# Patient Record
(demographics unavailable — no encounter records)

---

## 2024-10-07 NOTE — REASON FOR VISIT
[Home] : at home, [unfilled] , at the time of the visit. [Medical Office: ([facility])___] : at the medical office located in  [Initial Consultation] : an initial consultation [Phimosis] : phimosis [PCP] : ~pcp~ [Parents] : parents

## 2024-10-07 NOTE — REASON FOR VISIT
[Home] : at home, [unfilled] , at the time of the visit. [Medical Office: ([facility])___] : at the medical office located in  [Initial Consultation] : an initial consultation [Phimosis] : phimosis [PCP] : ~pcp~ [Parents] : parents

## 2024-10-08 NOTE — HISTORY OF PRESENT ILLNESS
[TextBox_4] : I verified the identity of the patient and the reason for the appointment with the parent. I explained to the parent that telemedicine encounters are not the same as a direct patient/healthcare provider visit because the patient and healthcare provider are not in the same room, which can result in limitations, including with the physical examination. I explained that the telemedicine encounter may require the patients genitalia to be shown. I explained that after the telemedicine encounter, the patient may require an office visit for an in-person physical examination, ultrasound, or other testing. I informed the parent that there may be privacy risks associated with the use of the technology and that there may be costs associated with the encounter. I offered the option of an office visit rather than a telemedicine encounter. Parent stated that all explanations were understood, and that all questions were answered to their satisfaction. The parent verbalized their preference and consent to proceed with the telemedicine encounter.  TERRI is here today for evaluation.  He was born at term after an unassisted conception and uneventful pregnancy and delivery.  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 Making ample wet diapers.  No infections.

## 2024-10-08 NOTE — CONSULT LETTER
[FreeTextEntry1] : Dear Dr. MEAGAN BANUELOS ,  I had the pleasure of consulting on ERICAWENDIS MASTORA today.  Below is my note regarding the office visit today.  Thank you so very much for allowing me to participate in TERRI's  care.  Please don't hesitate to call me should any questions or issues arise .  Sincerely,   Winston Bass MD, FACS, FSPU Chief, Pediatric Urology Professor of Urology and Pediatrics [employer] [facility] [employer]  President, [employer] Association - [place] Section Past-President, Societies for Pediatric Urology

## 2024-10-08 NOTE — ASSESSMENT
[FreeTextEntry1] : TERRI has phimosis.  We discussed phimosis and its implications,  We then discussed the management options, including monitoring, use of steroids, and circumcision. The risks and benefits and possible complications of each option (including but not limited to reaction to steroids, bleeding, injury, incomplete circumcision and need for additional injury) was discussed and all questions were answered.  The decision for in office circumcision with EMLA was made, which they will schedule.  I reiterated the anticipated post-circumcision course and instructions.  All questions were answered

## 2024-10-08 NOTE — CONSULT LETTER
[FreeTextEntry1] : Dear Dr. MEAGAN BANUELOS ,  I had the pleasure of consulting on ERICAWENDIS MASTORA today.  Below is my note regarding the office visit today.  Thank you so very much for allowing me to participate in TERRI's  care.  Please don't hesitate to call me should any questions or issues arise .  Sincerely,   Winston Bass MD, FACS, FSPU Chief, Pediatric Urology Professor of Urology and Pediatrics [facility] [employer]  President, [employer] Association - [place] Section Past-President, Societies for Pediatric Urology

## 2024-10-09 NOTE — HISTORY OF PRESENT ILLNESS
[TextBox_4] : TERRI is here today for a follow up visit.  He was born at term after an unassisted conception and uneventful pregnancy and delivery.  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 Making ample wet diapers.  No infections.

## 2024-10-09 NOTE — CONSULT LETTER
[FreeTextEntry1] : Dear Dr. MEAGAN BANUELOS ,  I had the pleasure of seeing  TERRI CARVAJAL for follow up today.  Below is my note regarding the office visit today.  Thank you so very much for allowing me to participate in TERRI's  care.  Please don't hesitate to call me should any questions or issues arise .  Sincerely,   Winston Bass MD, FACS, FSPU Chief, Pediatric Urology Professor of Urology and Pediatrics [facility] [employer]  President, [employer] Association - [place] Section Past-President, Societies for Pediatric Urology

## 2024-10-09 NOTE — CONSULT LETTER
[FreeTextEntry1] : Dear Dr. MEAGAN BANUELOS ,  I had the pleasure of seeing  TERRI CARVAJAL for follow up today.  Below is my note regarding the office visit today.  Thank you so very much for allowing me to participate in TERRI's  care.  Please don't hesitate to call me should any questions or issues arise .  Sincerely,   Winston Bass MD, FACS, FSPU Chief, Pediatric Urology Professor of Urology and Pediatrics [facility] [employer]  President, American U[employer] Association - [place] Section Past-President, Societies for Pediatric Urology

## 2024-10-09 NOTE — ASSESSMENT
[FreeTextEntry1] : TERRI has phimosis; no abnormalities were noted today.  We discussed phimosis and its implications,  We then discussed the management options, including monitoring, use of steroids, and circumcision. The risks and benefits and possible complications of each option (including but not limited to reaction to steroids, bleeding, injury, incomplete circumcision and need for additional injury) was discussed and all questions were answered.  The decision for in office circumcision with EMLA was made.  We performed the procedure using a Plastibell that needs to fall off spontaneously or in the office if needed.  I reiterated the anticipated post-circumcision course and instructions.  All questions were answered.

## 2024-10-24 NOTE — HISTORY OF PRESENT ILLNESS
[Breast milk] : breast milk [Formula ___ oz/feed] : [unfilled] oz of formula per feed [Hours between feeds ___] : Child is fed every [unfilled] hours [Frequency of stools: ___] : Frequency of stools: [unfilled]  stools [every other day] : every other day. [Green/brown] : green/brown [Loose] : loose consistency [In Bassinet/Crib] : sleeps in bassinet/crib [On back] : sleeps on back [Pacifier use] : Pacifier use [No] : No cigarette smoke exposure [Rear facing car seat in back seat] : Rear facing car seat in back seat [Carbon Monoxide Detectors] : Carbon monoxide detectors at home [Smoke Detectors] : Smoke detectors at home. [Mother] : mother [Normal] : Normal [Water heater temperature set at <120 degrees F] : Water heater temperature set at <120 degrees F [NO] : No [Co-sleeping] : no co-sleeping [Loose bedding, pillow, toys, and/or bumpers in crib] : no loose bedding, pillow, toys, and/or bumpers in crib [Exposure to electronic nicotine delivery system] : No exposure to electronic nicotine delivery system [At risk for exposure to TB] : Not at risk for exposure to Tuberculosis  [FreeTextEntry8] : a little constipation

## 2024-11-15 NOTE — HISTORY OF PRESENT ILLNESS
[EENT/Resp Symptoms] : EENT/RESPIRATORY SYMPTOMS [Nasal congestion] : nasal congestion [Cough] : cough [___ Day(s)] : [unfilled] day(s) [Wet cough] : wet cough [Known exposure to COVID-19] : no known exposure to COVID-19 [Hx of recent COVID-19 infection] : no history of recent COVID-19 infection

## 2024-11-26 NOTE — HISTORY OF PRESENT ILLNESS
[Exposure to electronic nicotine delivery system] : No exposure to electronic nicotine delivery system [At risk for exposure to TB] : Not at risk for exposure to Tuberculosis

## 2024-11-26 NOTE — DISCUSSION/SUMMARY
[Normal Growth] : growth [Normal Development] : development  [None] : no medical problems [] : The components of the vaccine(s) to be administered today are listed in the plan of care. The disease(s) for which the vaccine(s) are intended to prevent and the risks have been discussed with the caretaker.  The risks are also included in the appropriate vaccination information statements which have been provided to the patient's caregiver.  The caregiver has given consent to vaccinate. [FreeTextEntry1] : Recommend exclusive breastfeeding, 8-12 feedings per day. Mother should continue prenatal vitamins and avoid alcohol. If formula is needed, recommend iron-fortified formulations, 2-4 oz every 3-4 hrs. When in car, patient should be in rear-facing car seat in back seat. Put baby to sleep on back, in own crib with no loose or soft bedding. Help baby to maintain sleep and feeding routines. May offer pacifier if needed. Continue tummy time when awake. Parents counseled to call if rectal temperature >100.4 degrees F. Dtap/IPV/Hib, PCV, rota vaccines today. Tolerated well.

## 2024-11-26 NOTE — PHYSICAL EXAM
[Alert] : alert [Acute Distress] : no acute distress [Normocephalic] : normocephalic [Flat Open Anterior [place]] : flat open anterior fontanelle [PERRL] : PERRL [Red Reflex Bilateral] : red reflex bilateral [Normally Placed Ears] : normally placed ears [Auricles Well Formed] : auricles well formed [Clear Tympanic membranes] : clear tympanic membranes [Light reflex present] : light reflex present [Bony landmarks visible] : bony landmarks visible [Discharge] : no discharge [Nares Patent] : nares patent [Palate Intact] : palate intact [Uvula Midline] : uvula midline [Supple, full passive range of motion] : supple, full passive range of motion [Palpable Masses] : no palpable masses [Symmetric Chest Rise] : symmetric chest rise [Clear to Auscultation Bilaterally] : clear to auscultation bilaterally [Regular Rate and Rhythm] : regular rate and rhythm [S1, S2 present] : S1, S2 present [Murmurs] : no murmurs [+2 Femoral Pulses] : +2 femoral pulses [Soft] : soft [Tender] : nontender [Distended] : not distended [Bowel Sounds] : bowel sounds present [Hepatomegaly] : no hepatomegaly [Splenomegaly] : no splenomegaly [Normal external genitailia] : normal external genitalia [Central Urethral Opening] : central urethral opening [Testicles Descended Bilaterally] : testicles descended bilaterally [Normally Placed] : normally placed [No Abnormal Lymph Nodes Palpated] : no abnormal lymph nodes palpated [Woodward-Ortolani] : negative Woodward-Ortolani [Symmetric Flexed Extremities] : symmetric flexed extremities [Spinal Dimple] : no spinal dimple [Tuft of Hair] : no tuft of hair [Startle Reflex] : startle reflex present [Suck Reflex] : suck reflex present [Rooting] : rooting reflex present [Palmar Grasp] : palmar grasp reflex present [Plantar Grasp] : plantar grasp reflex present [Symmetric Ash] : symmetric [place] [Rash and/or lesion present] : no rash/lesion

## 2024-11-26 NOTE — PHYSICAL EXAM
[Alert] : alert [Acute Distress] : no acute distress [Normocephalic] : normocephalic [Flat Open Anterior [place]] : flat open anterior fontanelle [PERRL] : PERRL [Red Reflex Bilateral] : red reflex bilateral [Normally Placed Ears] : normally placed ears [Auricles Well Formed] : auricles well formed [Clear Tympanic membranes] : clear tympanic membranes [Light reflex present] : light reflex present [Bony landmarks visible] : bony landmarks visible [Discharge] : no discharge [Nares Patent] : nares patent [Palate Intact] : palate intact [Uvula Midline] : uvula midline [Supple, full passive range of motion] : supple, full passive range of motion [Palpable Masses] : no palpable masses [Symmetric Chest Rise] : symmetric chest rise [Clear to Auscultation Bilaterally] : clear to auscultation bilaterally [Regular Rate and Rhythm] : regular rate and rhythm [S1, S2 present] : S1, S2 present [Murmurs] : no murmurs [+2 Femoral Pulses] : +2 femoral pulses [Soft] : soft [Tender] : nontender [Distended] : not distended [Bowel Sounds] : bowel sounds present [Hepatomegaly] : no hepatomegaly [Splenomegaly] : no splenomegaly [Normal external genitailia] : normal external genitalia [Central Urethral Opening] : central urethral opening [Testicles Descended Bilaterally] : testicles descended bilaterally [Normally Placed] : normally placed [No Abnormal Lymph Nodes Palpated] : no abnormal lymph nodes palpated [Woodward-Ortolani] : negative Woodward-Ortolani [Symmetric Flexed Extremities] : symmetric flexed extremities [Spinal Dimple] : no spinal dimple [Tuft of Hair] : no tuft of hair [Startle Reflex] : startle reflex present [Suck Reflex] : suck reflex present [Rooting] : rooting reflex present [Palmar Grasp] : palmar grasp reflex present [Plantar Grasp] : plantar grasp reflex present [Symmetric Ash] : symmetric [place] [Rash and/or lesion present] : no rash/lesion

## 2025-01-22 NOTE — DEVELOPMENTAL MILESTONES
[Laughs aloud] : laughs aloud [Turns to voice] : turns to voice [Vocalizes with extending cooing] : vocalizes with extending cooing [Rolls over prone to supine] : rolls over prone to supine [Supports on elbows & wrists in prone] : supports on elbows and wrists in prone [Keeps hands unfisted] : keeps hands unfisted [Plays with fingers in midline] : plays with fingers in midline [Grasps objects] : grasps objects [Normal Development] : Normal Development [None] : none [Passed] : passed

## 2025-01-22 NOTE — PHYSICAL EXAM
[Alert] : alert [Normocephalic] : normocephalic [Flat Open Anterior [place]] : flat open anterior fontanelle [Red Reflex] : red reflex bilateral [PERRL] : PERRL [Normally Placed Ears] : normally placed ears [Auricles Well Formed] : auricles well formed [Clear Tympanic membranes] : clear tympanic membranes [Light reflex present] : light reflex present [Bony landmarks visible] : bony landmarks visible [Nares Patent] : nares patent [Palate Intact] : palate intact [Uvula Midline] : uvula midline [Symmetric Chest Rise] : symmetric chest rise [Clear to Auscultation Bilaterally] : clear to auscultation bilaterally [Regular Rate and Rhythm] : regular rate and rhythm [S1, S2 present] : S1, S2 present [+2 Femoral Pulses] : (+) 2 femoral pulses [Soft] : soft [Bowel Sounds] : bowel sounds present [Central Urethral Opening] : central urethral opening [Testicles Descended] : testicles descended bilaterally [Patent] : patent [Normally Placed] : normally placed [No Abnormal Lymph Nodes Palpated] : no abnormal lymph nodes palpated [Startle Reflex] : startle reflex present [Plantar Grasp] : plantar grasp reflex present [Symmetric Ash] : symmetric ash [Acute Distress] : no acute distress [Discharge] : no discharge [Palpable Masses] : no palpable masses [Murmurs] : no murmurs [Tender] : nontender [Distended] : nondistended [Hepatomegaly] : no hepatomegaly [Splenomegaly] : no splenomegaly [Woodward-Ortolani] : negative Woodward-Ortolani [Allis Sign] : negative Allis sign [Spinal Dimple] : no spinal dimple [Tuft of Hair] : no tuft of hair [Rash or Lesions] : no rash/lesions

## 2025-01-22 NOTE — PHYSICAL EXAM
[Alert] : alert [Normocephalic] : normocephalic [Flat Open Anterior [place]] : flat open anterior fontanelle [Red Reflex] : red reflex bilateral [PERRL] : PERRL [Normally Placed Ears] : normally placed ears [Auricles Well Formed] : auricles well formed [Clear Tympanic membranes] : clear tympanic membranes [Light reflex present] : light reflex present [Bony landmarks visible] : bony landmarks visible [Nares Patent] : nares patent [Palate Intact] : palate intact [Uvula Midline] : uvula midline [Symmetric Chest Rise] : symmetric chest rise [Clear to Auscultation Bilaterally] : clear to auscultation bilaterally [Regular Rate and Rhythm] : regular rate and rhythm [S1, S2 present] : S1, S2 present [+2 Femoral Pulses] : (+) 2 femoral pulses [Soft] : soft [Bowel Sounds] : bowel sounds present [Central Urethral Opening] : central urethral opening [Testicles Descended] : testicles descended bilaterally [Patent] : patent [Normally Placed] : normally placed [No Abnormal Lymph Nodes Palpated] : no abnormal lymph nodes palpated [Startle Reflex] : startle reflex present [Plantar Grasp] : plantar grasp reflex present [Symmetric Ash] : symmetric ash [Acute Distress] : no acute distress [Discharge] : no discharge [Palpable Masses] : no palpable masses [Murmurs] : no murmurs [Tender] : nontender [Distended] : nondistended [Hepatomegaly] : no hepatomegaly [Splenomegaly] : no splenomegaly [Woodward-Ortolani] : negative Woodward-Ortolani [Allis Sign] : negative Allis sign [Spinal Dimple] : no spinal dimple [Tuft of Hair] : no tuft of hair [Rash or Lesions] : no rash/lesions

## 2025-01-22 NOTE — DISCUSSION/SUMMARY
[Anticipatory Guidance Given] : Anticipatory guidance addressed as per the history of present illness section [Family Functioning] : family functioning [Nutritional Adequacy and Growth] : nutritional adequacy and growth [Infant Development] : infant development [Oral Health] : oral health [Safety] : safety [Age Approp Vaccines] : Age appropriate vaccines administered [DTaP] : diptheria, tetanus and pertussis [HiB] : haemophilus influenzae type B [IPV] : inactivated poliovirus [PCV] : pneumococcal conjugate vaccine [Rotavirus] : rotavirus [Mother] : mother [Parental Concerns Addressed] : Parental concerns addressed [] : The components of the vaccine(s) to be administered today are listed in the plan of care. The disease(s) for which the vaccine(s) are intended to prevent and the risks have been discussed with the caretaker.  The risks are also included in the appropriate vaccination information statements which have been provided to the patient's caregiver.  The caregiver has given consent to vaccinate. [FreeTextEntry1] : Recommend breastfeeding, 8-12 feedings per day. Mother should continue prenatal vitamins and avoid alcohol. If formula is needed, recommend iron-fortified formulations, 2-4 oz every 3-4 hrs. Cereal may be introduced using a spoon and bowl. When in car, patient should be in rear-facing car seat in back seat. Put baby to sleep on back, in own crib with no loose or soft bedding. Lower crib matress. Help baby to maintain sleep and feeding routines. May offer pacifier if needed. Continue tummy time when awake.

## 2025-01-22 NOTE — HISTORY OF PRESENT ILLNESS
[Mother] : mother [Formula ___ oz/feed] : [unfilled] oz of formula per feed [Hours between feeds ___] : Child is fed every [unfilled] hours [Normal] : Normal [Frequency of stools: ___] : Frequency of stools: [unfilled]  stools [per day] : per day. [Green/brown] : green/brown [Yellow] : yellow [Loose] : loose consistency [In Bassinet/Crib] : sleeps in bassinet/crib [On back] : sleeps on back [Sleeps 12-16 hours per 24 hours (including naps)] : sleeps 12-16 hours per 24 hours (including naps) [Pacifier use] : Pacifier use [Tummy time] : tummy time [Screen time only for video chatting] : screen time only for video chatting [No] : No cigarette smoke exposure [Water heater temperature set at <120 degrees F] : Water heater temperature set at <120 degrees F [Rear facing car seat in back seat] : Rear facing car seat in back seat [Carbon Monoxide Detectors] : Carbon monoxide detectors at home [Smoke Detectors] : Smoke detectors at home. [Fruits] : no fruits [Vegetables] : no vegetables [Cereal] : no cereal [Co-sleeping] : no co-sleeping [Loose bedding, pillow, toys, and/or bumpers in crib] : no loose bedding, pillow, toys, and/or bumpers in crib [Exposure to electronic nicotine delivery system] : No exposure to electronic nicotine delivery system

## 2025-02-13 NOTE — HISTORY OF PRESENT ILLNESS
[de-identified] : eye discharge runny nose [FreeTextEntry6] : no coughing, pt is sneezing, nasal congestion, redness on right eye, yellow discharge from both eyes.  Pt started  last week, Monday.

## 2025-02-13 NOTE — DISCUSSION/SUMMARY
[FreeTextEntry1] : Recommend supportive care with warm compresses and application of antibiotic eye drops. Return if symptoms worsen. Symptoms likely due to viral URI. Recommend supportive care including antipyretics, fluids, and nasal saline followed by nasal suction. Return if symptoms worsen or persist.

## 2025-02-13 NOTE — HISTORY OF PRESENT ILLNESS
[de-identified] : eye discharge runny nose [FreeTextEntry6] : no coughing, pt is sneezing, nasal congestion, redness on right eye, yellow discharge from both eyes.  Pt started  last week, Monday.

## 2025-02-13 NOTE — PHYSICAL EXAM
[Discharge] : discharge [Eyelid Swelling] : eyelid swelling [Bilateral] : (bilateral) [Clear Rhinorrhea] : clear rhinorrhea [Mucoid Discharge] : mucoid discharge [NL] : warm, clear

## 2025-03-21 NOTE — DEVELOPMENTAL MILESTONES
[Pats or smiles at reflection] : pats or smiles at reflection [Begins to turn when name called] : begins to turn when name called [Rolls over prone to supine] : rolls over prone to supine [Reaches for object and transfers] : reaches for object and transfers [Rakes small object with 4 fingers] : rakes small object with 4 fingers [[place] small object on surface] : bangs small object on surface [Normal Development] : Normal Development [None] : none [Babbles] : babbles [Sits briefly without support] : sits briefly without support [Passed] : passed

## 2025-03-21 NOTE — PHYSICAL EXAM
[Alert] : alert [Acute Distress] : no acute distress [Normocephalic] : normocephalic [Flat Open Anterior [place]] : flat open anterior fontanelle [Red Reflex] : red reflex bilateral [PERRL] : PERRL [Normally Placed Ears] : normally placed ears [Auricles Well Formed] : auricles well formed [Clear Tympanic membranes] : clear tympanic membranes [Light reflex present] : light reflex present [Bony landmarks visible] : bony landmarks visible [Discharge] : no discharge [Nares Patent] : nares patent [Palate Intact] : palate intact [Uvula Midline] : uvula midline [Tooth Eruption] : no tooth eruption [Supple, full passive range of motion] : supple, full passive range of motion [Palpable Masses] : no palpable masses [Symmetric Chest Rise] : symmetric chest rise [Clear to Auscultation Bilaterally] : clear to auscultation bilaterally [Regular Rate and Rhythm] : regular rate and rhythm [S1, S2 present] : S1, S2 present [Murmurs] : no murmurs [+2 Femoral Pulses] : (+) 2 femoral pulses [Soft] : soft [Tender] : nontender [Distended] : nondistended [Bowel Sounds] : bowel sounds present [Hepatomegaly] : no hepatomegaly [Splenomegaly] : no splenomegaly [Normal External Genitalia] : normal external genitalia [Central Urethral Opening] : central urethral opening [Testicles Descended] : testicles descended bilaterally [Patent] : patent [Normally Placed] : normally placed [No Abnormal Lymph Nodes Palpated] : no abnormal lymph nodes palpated [Woodward-Ortolani] : negative Woodward-Ortolani [Allis Sign] : negative Allis sign [Symmetric Buttocks Creases] : symmetric buttocks creases [Spinal Dimple] : no spinal dimple [Tuft of Hair] : no tuft of hair [Straight] : straight [Plantar Grasp] : plantar grasp reflex present [Cranial Nerves Grossly Intact] : cranial nerves grossly intact [Rash or Lesions] : no rash/lesions [de-identified] : ford stage I nml male

## 2025-03-21 NOTE — HISTORY OF PRESENT ILLNESS
[Parents] : parents [Formula ___ oz/feed] : [unfilled] oz of formula per feed [Hours between feeds ___] : Child is fed every [unfilled] hours [___ Feeding per 24 hrs] : a  total of [unfilled] feedings in 24 hours [Fruits] : fruits [Vegetables] : vegetables [___ voids per day] : [unfilled] voids per day [Frequency of stools: ___] : Frequency of stools: [unfilled]  stools [Green/brown] : green/brown [Pasty] : pasty [Seedy] : seedy [Loose] : loose consistency [In Bassinet/Crib] : sleeps in bassinet/crib [On back] : sleeps on back [Sleeps 12-16 hours per 24 hours (including naps)] : sleeps 12-16 hours per 24 hours (including naps) [Tummy time] : tummy time [Normal] : Normal [per day] : per day. [No] : No cigarette smoke exposure [Rear facing car seat in back seat] : Rear facing car seat in back seat [Carbon Monoxide Detectors] : Carbon monoxide detectors at home [Smoke Detectors] : Smoke detectors at home. [NO] : No [Well-balanced] : well-balanced [Cereal] : no cereal [Egg] : no egg [Meat] : no meat [Fish] : no fish [Peanut] : no peanut [Dairy] : no dairy [Co-sleeping] : no co-sleeping [Loose bedding, pillow, toys, and/or bumpers in crib] : no loose bedding, pillow, toys, and/or bumpers in crib [Exposure to electronic nicotine delivery system] : No exposure to electronic nicotine delivery system

## 2025-05-02 NOTE — HISTORY OF PRESENT ILLNESS
[de-identified] : Mobile lumps near left jaw [FreeTextEntry6] : A couple of days ago mother was rubbing infant's left cheek trying to soothe him to fall asleep and noted mobile lumps near left jaw.  Of note, infant is teething.  No pain.  No fever.  No rash. Tolerating PO intake and voiding well.  No emesis or diarrhea.

## 2025-05-02 NOTE — HISTORY OF PRESENT ILLNESS
[de-identified] : Mobile lumps near left jaw [FreeTextEntry6] : A couple of days ago mother was rubbing infant's left cheek trying to soothe him to fall asleep and noted mobile lumps near left jaw.  Of note, infant is teething.  No pain.  No fever.  No rash. Tolerating PO intake and voiding well.  No emesis or diarrhea.

## 2025-05-02 NOTE — PHYSICAL EXAM
[NL] : warm, clear [FreeTextEntry8] : grade 1/6 systolic murmur LMS border, non-radiating, vibratory

## 2025-05-02 NOTE — DISCUSSION/SUMMARY
[FreeTextEntry1] : No mobile lumps noted near jaws on exam. Reassurance provided. Recommend acetaminophen or ibuprofen prn. Offer teething rings. Apply cold or warm compress to gums.  Follow up with pediatric cardiology of murmur.  [] : The components of the vaccine(s) to be administered today are listed in the plan of care. The disease(s) for which the vaccine(s) are intended to prevent and the risks have been discussed with the caretaker.  The risks are also included in the appropriate vaccination information statements which have been provided to the patient's caregiver.  The caregiver has given consent to vaccinate.

## 2025-05-14 NOTE — HISTORY OF PRESENT ILLNESS
[de-identified] : diaper rash [FreeTextEntry6] : Mother states pt has had a diaper rash since Saturday. Mother has been administering desitin t to the area.

## 2025-05-14 NOTE — PHYSICAL EXAM
[NL] : warm, clear [Excoriated] : excoriated [Erythematous] : erythematous [de-identified] : testicles, groin

## 2025-05-21 NOTE — DISCUSSION/SUMMARY
[FreeTextEntry1] : exam improved since last week cont treatment for additional week use desitin with every diaper change  return if rash persist

## 2025-05-21 NOTE — PHYSICAL EXAM
[NL] : warm, clear [Erythematous] : erythematous [de-identified] : redness of left groin, right testicle

## 2025-05-21 NOTE — HISTORY OF PRESENT ILLNESS
[FreeTextEntry6] : here for f/u candidal diaper rash improving per mom has been using nystatin in addition to desitin for 1 week

## 2025-07-03 NOTE — DEVELOPMENTAL MILESTONES
[Uses basic gestures] : uses basic gestures [Says "Junior" or "Mama"] : says "Junior" or "Mama" nonspecifically [Sits well without support] : sits well without support [Transitions between sitting and lying] : transitions between sitting and lying [Balances on hands and knees] : balances on hands and knees [Crawls] : crawls [Picks up small objects with 3 fingers] : picks up small objects with 3 fingers and thumb [Releases objects intentionally] : releases objects intentionally [[place] objects together] : bangs objects together [Normal Development] : Normal Development [None] : none

## 2025-07-03 NOTE — HISTORY OF PRESENT ILLNESS
[Mother] : mother [Formula ___ oz/feed] : [unfilled] oz of formula per feed [Formula ___ oz in 24 hrs] : [unfilled] oz of formula in 24 hours [Fruit] : fruit [Vegetables] : vegetables [Cereal] : cereal [Meat] : meat [Eggs] : eggs [Fish] : fish [Peanut] : peanut [Dairy] : dairy [Finger foods] : finger foods [Water] : water [___ voids per day] : [unfilled] voids per day [Frequency of stools: ___] : Frequency of stools: [unfilled]  stools [per day] : per day. [Loose] : loose consistency [In Crib] : sleeps in crib [On back] : sleeps on back [Sleeps 12-16 hours per 24 hours (including naps)] : sleeps 12-16 hours per 24 hours (including naps) [Pacifier use] : Pacifier use [Bottle in bed] : bottle in bed [Brushing teeth] : brushing teeth [Screen time only for video chatting] : screen time only for video chatting [No] : Not at  exposure [Water heater temperature set at <120 degrees F] : Water heater temperature set at <120 degrees F [Rear facing car seat in  back seat] : Rear facing car seat in  back seat [Carbon Monoxide Detectors] : Carbon monoxide detectors [Smoke Detectors] : Smoke detectors [Baby food] : no baby food [Co-sleeping] : no co-sleeping [Wakes up at night] : does not wake up at night [Loose bedding, pillow, toys, and/or bumpers in crib] : no loose bedding, pillow, toys, and/or bumpers in crib [Sippy Cup use] : not using sippy cup

## 2025-07-03 NOTE — PHYSICAL EXAM

## 2025-07-03 NOTE — PHYSICAL EXAM

## 2025-07-03 NOTE — DEVELOPMENTAL MILESTONES
[Uses basic gestures] : uses basic gestures [Says "Junior" or "Mama"] : says "Junior" or "Mama" nonspecifically [Sits well without support] : sits well without support [Transitions between sitting and lying] : transitions between sitting and lying [Balances on hands and knees] : balances on hands and knees [Crawls] : crawls [Picks up small objects with 3 fingers] : picks up small objects with 3 fingers and thumb [Releases objects intentionally] : releases objects intentionally [[place] objects together] : bangs objects together [Normal Development] : Normal Development [None] : none

## 2025-07-16 NOTE — REVIEW OF SYSTEMS
[Nl] : no feeding issues at this time. [Acting Fussy] : not acting ~L fussy [Fever] : no fever [Wgt Loss (___ Lbs)] : no recent weight loss [Pallor] : not pale [Discharge] : no discharge [Redness] : no redness [Nasal Discharge] : no nasal discharge [Nasal Stuffiness] : no nasal congestion [Stridor] : no stridor [Cyanosis] : no cyanosis [Edema] : no edema [Diaphoresis] : not diaphoretic [Tachypnea] : not tachypneic [Wheezing] : no wheezing [Cough] : no cough [Being A Poor Eater] : not a poor eater [Vomiting] : no vomiting [Diarrhea] : no diarrhea [Decrease In Appetite] : appetite not decreased [Fainting (Syncope)] : no fainting [Dec Consciousness] :  no decrease in consciousness [Seizure] : no seizures [Hypotonicity (Flaccid)] : not hypotonic [Refusal to Bear Wgt] : normal weight bearing [Puffy Hands/Feet] : no hand/feet puffiness [Rash] : no rash [Hemangioma] : no hemangioma [Jaundice] : no jaundice [Wound problems] : no wound problems [Bruising] : no tendency for easy bruising [Swollen Glands] : no lymphadenopathy [Enlarged [place]] : the fontanelle was not enlarged [Hoarse Cry] : no hoarse cry [Failure To Thrive] : no failure to thrive [Penis Circumcised] : not circumcised [Undescended Testes] : no undescended testicle [Ambiguous Genitals] : genitals not ambiguous [Dec Urine Output] : no oliguria

## 2025-07-16 NOTE — CARDIOLOGY SUMMARY
[Today's Date] : [unfilled] [FreeTextEntry1] : Sinus rhythm, rate 144/min, QRS axis +82 degrees, VT 0.11, QRS 0.27, QTc 0.44 seconds and is within normal limits for age. [FreeTextEntry2] : Summary:  1. Possibly, trivial left to right flow jet due to incompetent foramen ovale versus artifact (clip 10,  frames 20  ~ 30 and other frames). 2. Left aortic arch. First branch not shown to bifurcate.  - technically limited images preclude definitive diagnosis or exclusion of aberrant right subclavian  origin. 3. No other structural abnormalities seen. 4. Normal right ventricular morphology with qualitatively normal size and systolic function. 5. Normal left ventricular size, morphology and systolic function. 6. Technically limited examination due to patient agitation

## 2025-07-16 NOTE — CONSULT LETTER
[Today's Date] : [unfilled] [Name] : Name: [unfilled] [] : : ~~ [Today's Date:] : [unfilled] [Dear  ___:] : Dear Dr. [unfilled]: [Consult - Single Provider] : Thank you very much for allowing me to participate in the care of this patient. If you have any questions, please do not hesitate to contact me. [Sincerely,] : Sincerely, [FreeTextEntry4] : Dr. Danita Bowles [FreeTextEntry5] : [address] [facility] [FreeTextEntry6] : [place] [id number] [de-identified] : Nic Acevedo MD, FAAP, FACC, FAHA Chief Emeritus, Division of Pediatric Cardiology [facility] Professor, Department of Pediatrics, [facility]

## 2025-07-16 NOTE — PHYSICAL EXAM
[General Appearance - Alert] : alert [General Appearance - In No Acute Distress] : in no acute distress [General Appearance - Well Nourished] : well nourished [General Appearance - Well Developed] : well developed [General Appearance - Well-Appearing] : well appearing [Appearance Of Head] : the head was normocephalic [Facies] : there were no dysmorphic facial features [Sclera] : the conjunctiva were normal [Outer Ear] : the ears and nose were normal in appearance [Examination Of The Oral Cavity] : mucous membranes were moist and pink [Auscultation Breath Sounds / Voice Sounds] : breath sounds clear to auscultation bilaterally [Normal Chest Appearance] : the chest was normal in appearance [Apical Impulse] : quiet precordium with normal apical impulse [Heart Rate And Rhythm] : normal heart rate and rhythm [Heart Sounds] : normal S1 and S2 [Heart Sounds Gallop] : no gallops [Heart Sounds Pericardial Friction Rub] : no pericardial rub [Edema] : no edema [Arterial Pulses] : normal upper and lower extremity pulses with no pulse delay [Heart Sounds Click] : no clicks [Capillary Refill Test] : normal capillary refill [Systolic] : systolic [II] : a grade 2/6 [LMSB] : LMSB  [Short] : short [Low] : low pitched [Vibratory] : vibratory [Early] : early [Base] : the murmur was transmitted to the base [Bowel Sounds] : normal bowel sounds [Abdomen Soft] : soft [Nondistended] : nondistended [Abdomen Tenderness] : non-tender [Nail Clubbing] : no clubbing  or cyanosis of the fingers [Cervical Lymph Nodes Enlarged Anterior] : The anterior cervical nodes were normal [Cervical Lymph Nodes Enlarged Posterior] : The posterior cervical nodes were normal [] : no rash [Skin Lesions] : no lesions [Skin Turgor] : normal turgor [Demonstrated Behavior - Infant Nonreactive To Parents] : interactive [Mood] : mood and affect were appropriate for age [Demonstrated Behavior] : normal behavior

## 2025-07-16 NOTE — END OF VISIT
[Time Spent: ___ minutes] : I have spent [unfilled] minutes of time on the encounter which excludes teaching and separately reported services.
normal (ped)...

## 2025-07-28 NOTE — DISCUSSION/SUMMARY
[FreeTextEntry1] :   - Hand, Foot, and Mouth Disease: The patient is diagnosed with early-, foot, and mouth disease based on the presence of characteristic spots on the hands, feet, and in the mouth, as well as the reported cases at the . The progression of spots during the examination further supports this diagnosis.     - Therapeutic Interventions: No specific medication is required as it is a viral infection. Focus on symptomatic relief.     - Diagnostic Tests: No additional tests are needed at this time.     - Patient Education: Explained the contagious nature of the disease and its typical course. Advised on the importance of hydration and potential use of over-the-counter pain relievers.     - Follow-Up: Keep the child home from  for at least the next day to monitor the progression of spots. Return to  will depend on the resolution of symptoms and  policies. Warned this is likely HFM and will need 7-10 days out of school.      - Hydration: Encourage increased fluid intake, including water, juice, and popsicles.     - Pain Management: Suggested using Tylenol or Motrin about 30 minutes before meals to improve comfort while eating.     - Monitoring: Watch for new spots and ensure the child is urinating at least six times a day.     - Hygiene: Emphasized the importance of good hygiene practices to prevent spread to family members.

## 2025-07-28 NOTE — HISTORY OF PRESENT ILLNESS
[de-identified] : red spots [FreeTextEntry6] : mother brought pt in due to the  witnessing red spots on the pt torso area  took the pt temperature and it was 99.8  No fever during time of visit  brought in by his parent after the  reported seeing about 15 spots on him. The parent noticed one spot in the morning on the foot, which they initially thought was a mosquito bite. The child had a reported temperature of 99.4F at , but no fever was noted at home. The child is eating, drinking, urinating, and defecating normally, and is not showing signs of pain or discomfort. The parent did not notice any significant spots except for one on the child's hand.

## 2025-07-28 NOTE — PHYSICAL EXAM
[Ulcerative Lesions] : ulcerative lesions [NL] : normotonic [de-identified] : L foot with a few small erythematous lesions, small lesions around the mouth